# Patient Record
Sex: MALE | Race: WHITE | URBAN - METROPOLITAN AREA
[De-identification: names, ages, dates, MRNs, and addresses within clinical notes are randomized per-mention and may not be internally consistent; named-entity substitution may affect disease eponyms.]

---

## 2017-01-04 ENCOUNTER — OFFICE VISIT (OUTPATIENT)
Dept: FAMILY MEDICINE CLINIC | Age: 47
End: 2017-01-04

## 2017-01-04 ENCOUNTER — TELEPHONE (OUTPATIENT)
Dept: FAMILY MEDICINE CLINIC | Age: 47
End: 2017-01-04

## 2017-01-04 VITALS
WEIGHT: 199.25 LBS | OXYGEN SATURATION: 99 % | DIASTOLIC BLOOD PRESSURE: 62 MMHG | TEMPERATURE: 98 F | HEIGHT: 72 IN | RESPIRATION RATE: 14 BRPM | SYSTOLIC BLOOD PRESSURE: 110 MMHG | BODY MASS INDEX: 26.99 KG/M2 | HEART RATE: 52 BPM

## 2017-01-04 DIAGNOSIS — J06.9 UPPER RESPIRATORY TRACT INFECTION, UNSPECIFIED TYPE: Primary | ICD-10-CM

## 2017-01-04 DIAGNOSIS — B02.21 RAMSAY HUNT AURICULAR SYNDROME: ICD-10-CM

## 2017-01-04 RX ORDER — AZITHROMYCIN 250 MG/1
TABLET, FILM COATED ORAL
Qty: 6 TAB | Refills: 0 | Status: SHIPPED | OUTPATIENT
Start: 2017-01-04 | End: 2017-01-09

## 2017-01-04 RX ORDER — VALACYCLOVIR HYDROCHLORIDE 1 G/1
1000 TABLET, FILM COATED ORAL 3 TIMES DAILY
Qty: 10 TAB | Refills: 0 | Status: SHIPPED | OUTPATIENT
Start: 2017-01-04 | End: 2019-06-30 | Stop reason: ALTCHOICE

## 2017-01-04 NOTE — MR AVS SNAPSHOT
Visit Information Date & Time Provider Department Dept. Phone Encounter #  
 1/4/2017  1:45 PM Kamran Villalobos MD [address] [place] [address] [phone] [id number] Follow-up Instructions Return in about 6 months (around 7/4/2017) for physical.  
  
Upcoming Health Maintenance Date Due DTaP/Tdap/Td series (2 - Td) 1/1/2023 Allergies as of 1/4/2017  Review Complete On: 1/4/2017 By: Kamran Villalobos MD  
 No Known Allergies Current Immunizations  Reviewed on 11/18/2014 Name Date Influenza Vaccine 11/7/2014 Tdap 1/1/2013 Not reviewed this visit You Were Diagnosed With   
  
 Codes Comments Upper respiratory tract infection, unspecified type    -  Primary ICD-10-CM: J06.9 ICD-9-CM: 465.9 Radha Celis auricular syndrome     ICD-10-CM: B02.21 
ICD-9-CM: 053.11 Vitals BP Pulse Temp Resp Height(growth percentile) Weight(growth percentile) 110/62 (BP 1 Location: Left arm, BP Patient Position: Sitting) (!) 52 98 °F (36.7 °C) (Oral) 14 6' (1.829 m) 199 lb 4 oz (90.4 kg) SpO2 BMI Smoking Status 99% 27.02 kg/m2 Never Smoker Vitals History BMI and BSA Data Body Mass Index Body Surface Area  
 27.02 kg/m 2 2.14 m 2 Preferred Pharmacy Pharmacy Name Phone [id number] [address] [facility] [place] [phone] Your Updated Medication List  
  
   
This list is accurate as of: 1/4/17  2:39 PM.  Always use your most recent med list.  
  
  
  
  
 AFLURIA [id number] (PF) Syrg injection Generic drug:  influenza vaccine 2016-17 (5 yr+)(PF)  
  
 azithromycin 250 mg tablet Commonly known as:  Conifer Abu Take 2 tablets today, then take 1 tablet daily  
  
 glucosamine 1,000 mg Tab Take  by mouth.  
  
 multivitamin tablet Commonly known as:  ONE A DAY Take 1 Tab by mouth daily. TYLENOL EXTRA STRENGTH 500 mg tablet Generic drug:  acetaminophen Take 2 Tabs by mouth every six (6) hours as needed for Pain. valACYclovir 1 gram tablet Commonly known as:  VALTREX Take 1 Tab by mouth three (3) times daily. Prescriptions Sent to Pharmacy Refills  
 valACYclovir (VALTREX) 1 gram tablet 0 Sig: Take 1 Tab by mouth three (3) times daily. Class: Normal  
 Pharmacy: [address] [place] [address] Ph #: [phone] Route: Oral  
 azithromycin (ZITHROMAX) 250 mg tablet 0 Sig: Take 2 tablets today, then take 1 tablet daily Class: Normal  
 Pharmacy: [address] [place] [address] Ph #: [phone] Follow-up Instructions Return in about 6 months (around 7/4/2017) for physical.  
  
  
Introducing [facility] SERVICES! Rachael Grayson introduces [employer] patient portal. Now you can access parts of your medical record, email your doctor's office, and request medication refills online. 1. In your internet browser, go to [email or web address] 2. Click on the First Time User? Click Here link in the Sign In box. You will see the New Member Sign Up page. 3. Enter your [employer] Access Code exactly as it appears below. You will not need to use this code after youve completed the sign-up process. If you do not sign up before the expiration date, you must request a new code. · Pro[employer] Access Code: [email or web address] Expires: 4/4/2017  2:39 PM 
 
4. Enter the last four digits of your Social Security Number (xxxx) and Date of Birth (mm/dd/yyyy) as indicated and click Submit. You will be taken to the next sign-up page. 5. Create a Comekst ID. This will be your [employer] login ID and cannot be changed, so think of one that is secure and easy to remember. 6. Create a Comekst password. You can change your password at any time. 7. Enter your Password Reset Question and Answer. This can be used at a later time if you forget your password. 8. Enter your e-mail address. You will receive e-mail notification when new information is available in [address] 9. Click Sign Up. You can now view and download portions of your medical record. 10. Click the Download Summary menu link to download a portable copy of your medical information. If you have questions, please visit the Frequently Asked Questions section of the [employer] website. Remember, [employer] is NOT to be used for urgent needs. For medical emergencies, dial 911. Now available from your iPhone and Android! Please provide this summary of care documentation to your next provider. Your primary care clinician is listed as Any Lloyd. If you have any questions after today's visit, please call [phone].

## 2017-01-04 NOTE — PROGRESS NOTES
HISTORY OF PRESENT ILLNESS  Zulema Woodruff is a 48 y.o. male. Blood pressure 110/62, pulse (!) 52, temperature 98 °F (36.7 °C), temperature source Oral, resp. rate 14, height 6' (1.829 m), weight 199 lb 4 oz (90.4 kg), SpO2 99 %. Body mass index is 27.02 kg/(m^2). Chief Complaint   Patient presents with    Ear Pain     pain and pressure in left ear      HPI   Zulema Woodruff 48 y.o. male  presents to the office today with acute complaint of left ear pain. Bp at office today 110/62. Left ear pain/cold symptoms : Pt notes pain and pressure in left ear and in area below his left ear for past week. He rates pain as 2/10 and notes pain is aggravated by chewing. Pt states he first had a sore throat and then the \"the pain felt like it radiated from his throat to his left ear\". Pt has taken 1 tablet of OTC Motrin for a few days with no relief. Pt also notes cough for past two weeks. Cough produces white-grey mucus. Pt has history of Spring-Hunt auricular syndrome. He denies any fever, SOB, or wheezing. Likely a URI and related to his Spring-Celis syndrome. Advised pt to start taking Zithromax 250 mg daily and Valtrex 1 g TID. Pt to notify me if symptoms progress or fail to improve. Current Outpatient Prescriptions   Medication Sig Dispense Refill    multivitamin (ONE A DAY) tablet Take 1 Tab by mouth daily.  glucosamine 1,000 mg tab Take  by mouth.  AFLURIA [id number], PF, syrg injection       acetaminophen (TYLENOL EXTRA STRENGTH) 500 mg tablet Take 2 Tabs by mouth every six (6) hours as needed for Pain.        No Known Allergies  Past Medical History   Diagnosis Date    Spring Celis auricular syndrome      Past Surgical History   Procedure Laterality Date    Xr thumb lt min 2 v      Hx other surgical  5/15/13     ED visit for skin reattach on left hand, 1st finger     Family History   Problem Relation Age of Onset    Cancer Father      prostate     Social History   Substance Use Topics    Smoking status: Never Smoker    Smokeless tobacco: Never Used    Alcohol use No        Review of Systems   Constitutional: Negative. Negative for malaise/fatigue. HENT: Positive for ear pain (left ear). Eyes: Negative for blurred vision. Respiratory: Positive for cough and sputum production (white-grey). Negative for shortness of breath. Cardiovascular: Negative for chest pain and leg swelling. Musculoskeletal: Negative. Neurological: Negative. Negative for dizziness and headaches. All other systems reviewed and are negative. Physical Exam   Constitutional: He is oriented to person, place, and time. He appears well-developed and well-nourished. HENT:   Head: Normocephalic and atraumatic. Left Ear: Tympanic membrane and ear canal normal.   Small vesicles noted on the outer ear canal   Neck: Carotid bruit is not present. Cardiovascular: Normal rate, regular rhythm, normal heart sounds and intact distal pulses. Exam reveals no gallop and no friction rub. No murmur heard. Pulmonary/Chest: Effort normal and breath sounds normal. No respiratory distress. He has no wheezes. He has no rales. He exhibits no tenderness. Neurological: He is alert and oriented to person, place, and time. Psychiatric: He has a normal mood and affect. His behavior is normal. Judgment and thought content normal.   Nursing note and vitals reviewed. ASSESSMENT and PLAN  Michael Medley was seen today for ear pain. Diagnoses and all orders for this visit:    Upper respiratory tract infection, unspecified type  -     azithromycin (ZITHROMAX) 250 mg tablet; Take 2 tablets today, then take 1 tablet daily  - Advised pt to start taking Zithromax 250 mg daily and Valtrex 1 g TID. Pt to notify me if symptoms progress or fail to improve. Stoddard Celis auricular syndrome  -     valACYclovir (VALTREX) 1 gram tablet;  Take 1 Tab by mouth three (3) times daily.  - See above      Follow-up Disposition:  Return in about 6 months (around 7/4/2017) for physical.     Medication risks/benefits/costs/interactions/alternatives discussed with patient. Advised patient to call back or return to office if symptoms worsen/change/persist.  If patient cannot reach us or should anything more severe/urgent arise he/she should proceed directly to the nearest emergency department. Discussed expected course/resolution/complications of diagnosis in detail with patient. Patient given a written after visit summary which includes her diagnoses, current medications and vitals. Patient expressed understanding with the diagnosis and plan. Written by karla Oliveira, as dictated by Shannon Witt M.D.    I have reviewed and agree with the above note and have made corrections where appropriate, Dr. Eve Rodriguez MD

## 2017-01-04 NOTE — TELEPHONE ENCOUNTER
Contact # is [phone]    Patient's wife, [facility], is calling to see about getting patient an appointment today. She states last year Dr Adriana Reed misdiagnosed patient and patient ended up with some type of ear issue. She states Dr Adriana Reed should be familiar with the situation and it is starting to happen again. She states Dr Kuldip Caicedo is patient's PCP, however, Dr Adriana Reed is most familiar with this issue.

## 2017-01-04 NOTE — PROGRESS NOTES
Patient presents in office today with c/o pain and pressure in left ear, started as sore throat, hurts when eating  X 4 days. Chief Complaint   Patient presents with    Ear Pain     pain and pressure in left ear     1. Have you been to the ER, urgent care clinic since your last visit? Hospitalized since your last visit? No    2. Have you seen or consulted any other health care providers outside of the [facility] since your last visit? Include any pap smears or colon screening. No     Patient has not traveled outside of the [address] [place] [address] Road. In the past 30 days.       PHQ 2 / 9, over the last two weeks 1/4/2017   Little interest or pleasure in doing things Not at all   Feeling down, depressed or hopeless Not at all   Total Score PHQ 2 0

## 2017-01-04 NOTE — TELEPHONE ENCOUNTER
[phone] spoke to Casey Bardales advised her that we are fully booked today. She wants me to send message to Dr Tanya Hyde.

## 2019-06-27 ENCOUNTER — OFFICE VISIT (OUTPATIENT)
Dept: FAMILY MEDICINE CLINIC | Age: 49
End: 2019-06-27

## 2019-06-27 VITALS
TEMPERATURE: 98.6 F | HEART RATE: 51 BPM | HEIGHT: 72 IN | DIASTOLIC BLOOD PRESSURE: 56 MMHG | RESPIRATION RATE: 18 BRPM | WEIGHT: 191 LBS | BODY MASS INDEX: 25.87 KG/M2 | SYSTOLIC BLOOD PRESSURE: 104 MMHG | OXYGEN SATURATION: 99 %

## 2019-06-27 DIAGNOSIS — Z23 NEED FOR SHINGLES VACCINE: ICD-10-CM

## 2019-06-27 DIAGNOSIS — E55.9 VITAMIN D DEFICIENCY: ICD-10-CM

## 2019-06-27 DIAGNOSIS — Z00.00 ROUTINE GENERAL MEDICAL EXAMINATION AT A HEALTH CARE FACILITY: Primary | ICD-10-CM

## 2019-06-27 DIAGNOSIS — Z00.00 LABORATORY TESTS ORDERED AS PART OF A COMPLETE PHYSICAL EXAM (CPE): ICD-10-CM

## 2019-06-27 DIAGNOSIS — Z23 ENCOUNTER FOR IMMUNIZATION: ICD-10-CM

## 2019-06-27 PROBLEM — S61.209A FINGER WOUND, SIMPLE, OPEN: Status: ACTIVE | Noted: 2019-06-27

## 2019-06-27 NOTE — PROGRESS NOTES
HISTORY OF PRESENT ILLNESS  HPI  Shelba Schirmer is a 46 y.o. Male who presents to the office today for a complete physical. Pt complains of toe sensitivity and irritation in  the outside part of his right foot on his little toe and the adjacent toe for about a year. Pt notes the irritation is aggravated by pressure which radiates into his right calf and upper leg. Pt denies back surgery but has seen a chiropractor a few times and had an X-ray a few years ago. Pt says he has a lump in the back of his left lateral ankle, and notes he broke the ankle about 30 years ago. Pt mentions this occurred about 5-6 weeks ago. Pt notes swelling and popping bones in right lateral foot. Pt mentions he jogs 3 days a week. Pt states he had a lymph node removal in his abdomen about 4 years ago and complains of lingering stomach pain in LLQ, especially when he bends over. Pt says this pain sometimes shocks his back sometimes, with pain going down into left testicle at times. Pt reports a spot on the left side of the bridge of his nose. Pt says he uses sunscreen infrequently. Pt notes he decreased his amount of vitamin D taken from 5000 to 3000 units. Pt complains of decreased muscle recovery after going on runs. Pt denies unusual SOB, chest pain, and any recent ER visits or hospitalizations. Past Medical History:   Diagnosis Date    Radha Celis auricular syndrome      Past Surgical History:   Procedure Laterality Date    HX OTHER SURGICAL  5/15/13    ED visit for skin reattach on left hand, 1st finger    XR THUMB LT MIN 2 V       Current Outpatient Medications on File Prior to Visit   Medication Sig Dispense Refill    multivitamin (ONE A DAY) tablet Take 1 Tab by mouth daily.  glucosamine 1,000 mg tab Take  by mouth. No current facility-administered medications on file prior to visit.       No Known Allergies  Family History   Problem Relation Age of Onset    Cancer Father         prostate     Social History     Socioeconomic History    Marital status:      Spouse name: Not on file    Number of children: Not on file    Years of education: Not on file    Highest education level: Not on file   Tobacco Use    Smoking status: Never Smoker    Smokeless tobacco: Never Used   Substance and Sexual Activity    Alcohol use: No    Drug use: No    Sexual activity: Yes     Partners: Female     Birth control/protection: None             Review of Systems   Constitutional: Negative for chills, diaphoresis, fever, malaise/fatigue and weight loss. HENT: Negative for congestion, ear discharge, ear pain, hearing loss, nosebleeds, sore throat and tinnitus. Eyes: Negative for blurred vision, double vision, photophobia, pain, discharge and redness. Respiratory: Negative for cough, hemoptysis, sputum production, shortness of breath, wheezing and stridor. Cardiovascular: Negative for chest pain, palpitations, orthopnea, claudication, leg swelling and PND. Gastrointestinal: Negative for abdominal pain, blood in stool, constipation, diarrhea, heartburn, melena, nausea and vomiting. Genitourinary: Negative for dysuria, flank pain, frequency, hematuria and urgency. Musculoskeletal: Negative for back pain, falls, joint pain, myalgias and neck pain. Skin: Negative for itching and rash. Neurological: Negative for dizziness, tingling, tremors, sensory change, speech change, focal weakness, seizures, loss of consciousness, weakness and headaches. Endo/Heme/Allergies: Negative for environmental allergies and polydipsia. Does not bruise/bleed easily. Psychiatric/Behavioral: Negative for depression, hallucinations, memory loss, substance abuse and suicidal ideas. The patient is not nervous/anxious and does not have insomnia. Results for orders placed or performed in visit on 06/27/19   CBC W/O DIFF   Result Value Ref Range    WBC 4.1 3.4 - 10.8 x10E3/uL    RBC 4.50 4. 14 - 5.80 x10E6/uL    HGB 12.5 (L) 13.0 - 17.7 g/dL    HCT 40.1 37.5 - 51.0 %    MCV 89 79 - 97 fL    MCH 27.8 26.6 - 33.0 pg    MCHC 31.2 (L) 31.5 - 35.7 g/dL    RDW 15.0 12.3 - 15.4 %    PLATELET 600 162 - 045 [id number]/FN   METABOLIC PANEL, COMPREHENSIVE   Result Value Ref Range    Glucose 84 65 - 99 mg/dL    BUN 17 6 - 24 mg/dL    Creatinine 0.88 0.76 - 1.27 mg/dL    GFR est non- >59 mL/min/1.73    GFR est  >59 mL/min/1.73    BUN/Creatinine ratio 19 9 - 20    Sodium 142 134 - 144 mmol/L    Potassium 4.5 3.5 - 5.2 mmol/L    Chloride 106 96 - 106 mmol/L    CO2 25 20 - 29 mmol/L    Calcium 8.8 8.7 - 10.2 mg/dL    Protein, total 6.4 6.0 - 8.5 g/dL    Albumin 4.3 3.5 - 5.5 g/dL    GLOBULIN, TOTAL 2.1 1.5 - 4.5 g/dL    A-G Ratio 2.0 1.2 - 2.2    Bilirubin, total 0.3 0.0 - 1.2 mg/dL    Alk.  phosphatase 73 39 - 117 IU/L    AST (SGOT) 19 0 - 40 IU/L    ALT (SGPT) 12 0 - 44 IU/L   LIPID PANEL   Result Value Ref Range    Cholesterol, total 172 100 - 199 mg/dL    Triglyceride 41 0 - 149 mg/dL    HDL Cholesterol 56 >39 mg/dL    VLDL, calculated 8 5 - 40 mg/dL    LDL, calculated 108 (H) 0 - 99 mg/dL   URINALYSIS W/ RFLX MICROSCOPIC   Result Value Ref Range    Specific Gravity      >=1.030 (A) 1.005 - 1.030    pH (UA) 5.5 5.0 - 7.5    Color Yellow Yellow    Appearance Clear Clear    Leukocyte Esterase Negative Negative    Protein Negative Negative/Trace    Glucose Negative Negative    Ketone Trace (A) Negative    Blood Negative Negative    Bilirubin Negative Negative    Urobilinogen 0.2 0.2 - 1.0 mg/dL    Nitrites Negative Negative    Microscopic Examination Comment    PSA, DIAGNOSTIC (PROSTATE SPECIFIC AG)   Result Value Ref Range    Prostate Specific Ag 1.0 0.0 - 4.0 ng/mL   VITAMIN D, 25 HYDROXY   Result Value Ref Range    VITAMIN D, 25-HYDROXY 63.9 30.0 - 100.0 ng/mL   CVD REPORT   Result Value Ref Range    INTERPRETATION Note          Physical Exam  Visit Vitals  /56 (BP 1 Location: Right arm, BP Patient Position: Sitting)   Pulse (!) 51 Temp 98.6 °F (37 °C) (Oral)   Resp 18   Ht 6' (1.829 m)   Wt 191 lb (86.6 kg)   SpO2 99%   BMI 25.90 kg/m²       General:  Alert, cooperative, no distress, appears stated age. Head:  Normocephalic, without obvious abnormality, atraumatic. Eyes:  Conjunctivae/corneas clear. PERRL, EOMs intact. Fundi benign   Ears:  Normal TMs and external ear canals both ears. Nose: Nares normal. Septum midline. Mucosa normal. No drainage or sinus tenderness. Throat: Lips, mucosa, and tongue normal. Teeth and gums normal.   Neck: Supple, symmetrical, trachea midline, no adenopathy, thyroid: no enlargement/tenderness/nodules, no carotid bruit and no JVD. Back:   Symmetric, no curvature. ROM normal. No CVA tenderness. Lungs:   Clear to auscultation bilaterally. Chest wall:  No tenderness or deformity. Heart:  Regular rate and rhythm, S1, S2 normal, no murmur, click, rub or gallop. Abdomen:   Soft, non-tender. Bowel sounds normal. No masses,  No organomegaly. Genitalia:  Normal male without lesion, discharge or tenderness. Rectal:  Normal tone, normal prostate, no masses or tenderness  Guaiac negative stool. Extremities: Extremities normal, atraumatic, no cyanosis or edema. Pulses: 2+ and symmetric all extremities. Skin: Skin color, texture, turgor normal. No rashes or lesions. Pt has 1 cm cut at tip of right ring finger that is not infected. Pigmented area is present on midline of face next to nose that is flat with no atypical features (pt states no changes in past year). Typical ganglion cyst with normal overlying skin on lateral aspect of left ankle. Right 4th and 5th toes have erythema on dorsum where there is a prominent joint with tenderness to palpation. Lymph nodes: Cervical, supraclavicular, and axillary nodes normal.   Neurologic: CNII-XII intact. Normal strength, sensation and reflexes throughout. ASSESSMENT and PLAN    ICD-10-CM ICD-9-CM    1.  Routine general medical examination at a health care facility Z00.00 V70.0 CBC W/O DIFF      METABOLIC PANEL, COMPREHENSIVE      LIPID PANEL      URINALYSIS W/ RFLX MICROSCOPIC      PSA, DIAGNOSTIC (PROSTATE SPECIFIC AG)      VITAMIN D, 25 HYDROXY   2. Vitamin D deficiency E55.9 268.9 VITAMIN D, 25 HYDROXY   3. Laboratory tests ordered as part of a complete physical exam (CPE) Z00.00 V72.62 CBC W/O DIFF      METABOLIC PANEL, COMPREHENSIVE      LIPID PANEL   4. Need for shingles vaccine Z23 V04.89 varicella-zoster recombinant, PF, (SHINGRIX, PF,) 50 mcg/0.5 mL susr injection   5. Encounter for immunization Z23 V03.89 TETANUS AND DIPHTHERIA TOXOIDS (TD) ADSORBED, PRES. FREE, IN INDIVIDS. >=7, IM     Diagnoses and all orders for this visit:    1. Routine general medical examination at a health care facility  -     CBC W/O DIFF  -     METABOLIC PANEL, COMPREHENSIVE  -     LIPID PANEL  -     URINALYSIS W/ RFLX MICROSCOPIC  -     PSA, DIAGNOSTIC (PROSTATE SPECIFIC AG)  -     VITAMIN D, 25 HYDROXY    2. Vitamin D deficiency  -     VITAMIN D, 25 HYDROXY    3. Laboratory tests ordered as part of a complete physical exam (CPE)  -     CBC W/O DIFF  -     METABOLIC PANEL, COMPREHENSIVE  -     LIPID PANEL    4. Need for shingles vaccine  -     varicella-zoster recombinant, PF, (SHINGRIX, PF,) 50 mcg/0.5 mL susr injection; 0.5 mL by IntraMUSCular route once for 1 dose. Take 2 dose 2 months after first dose    5. Encounter for immunization  -     TETANUS AND DIPHTHERIA TOXOIDS (TD) ADSORBED, PRES. FREE, IN INDIVIDS. >=7, IM    Other orders  -     CVD REPORT          lab results and schedule of future lab studies reviewed with patient  reviewed diet, exercise and weight control  cardiovascular risk and specific lipid/LDL goals reviewed  reviewed medications and side effects in detail  Please call my office if there are any questions- [phone]. I will arrange for follow up after the lab tests done from today return  Recommended a weekly \"heart check. \" I went into detail how to do this. Call for refills on medications as needed. Discussed expected course/resolution/complications of diagnosis in detail with patient. Medication risks/benefits/costs/interactions/alternatives discussed with patient. Pt was given an after visit summary which includes diagnoses, current medications & vitals. Pt expressed understanding with the diagnosis and plan    Reassured him that all of his complaints are of no major concern and recommended that he notify me is there are any significant changes concerning these issues. For the toe pain, suggested padding with mole skin. For his LLQ abdominal discomfort, suggested he try a deep massage and stretching of the area; this is typical of post surgery healing/  Scar discomfort. There is family history of prostate cancer so pt should have his checked yearly. Recommended he have a colonoscopy. This document was written by Ezekiel Miner, as dictated by Gerri Canchola MD.  I have reviewed and agree with the above note and have made corrections where appropriate Ciro Harvey M.D.

## 2019-06-27 NOTE — LETTER
June 27, 2019 Deborah De La Rosa [id number] [place] [id number] Dear Jing Moncada: Thank you for requesting access to [employer]. Please follow the instructions below to securely access and download your online medical record. [employer] allows you to send messages to your doctor, view your test results, renew your prescriptions, schedule appointments, and more. How Do I Sign Up? 1. In your internet browser, go to [email or web address]. 2. Click on the First Time User? Click Here link in the Sign In box. You will see the New Member Sign Up page. 3. Enter your [employer] Access Code exactly as it appears below. You will not need to use this code after youve completed the sign-up process. If you do not sign up before the expiration date, you must request a new code. [employer] Access Code: [email or web address] Expires: 8/11/2019 11:08 AM  
 
4. Enter the last four digits of your Social Security Number (xxxx) and Date of Birth (mm/dd/yyyy) as indicated and click Submit. You will be taken to the next sign-up page. 5. Create a [employer]une ID. This will be your [employer] login ID and cannot be changed, so think of one that is secure and easy to remember. 6. Create a [employer] password. You can change your password at any time. 7. Enter your Password Reset Question and Answer. This can be used at a later time if you forget your password. 8. Enter your e-mail address. You will receive e-mail notification when new information is available in [address]. 9. Click Sign Up. You can now view and download portions of your medical record. 10. Click the Download Summary menu link to download a portable copy of your medical information. Additional Information If you have questions, please visit the Frequently Asked Questions section of the [employer] website at [email or web address] Remember, [employer] is NOT to be used for urgent needs. For medical emergencies, dial 911. Now available from your iPhone and Android! Sincerely, [employer]

## 2019-06-28 LAB
25(OH)D3+25(OH)D2 SERPL-MCNC: 63.9 NG/ML (ref 30–100)
ALBUMIN SERPL-MCNC: 4.3 G/DL (ref 3.5–5.5)
ALBUMIN/GLOB SERPL: 2 {RATIO} (ref 1.2–2.2)
ALP SERPL-CCNC: 73 IU/L (ref 39–117)
ALT SERPL-CCNC: 12 IU/L (ref 0–44)
APPEARANCE UR: CLEAR
AST SERPL-CCNC: 19 IU/L (ref 0–40)
BILIRUB SERPL-MCNC: 0.3 MG/DL (ref 0–1.2)
BILIRUB UR QL STRIP: NEGATIVE
BUN SERPL-MCNC: 17 MG/DL (ref 6–24)
BUN/CREAT SERPL: 19 (ref 9–20)
CALCIUM SERPL-MCNC: 8.8 MG/DL (ref 8.7–10.2)
CHLORIDE SERPL-SCNC: 106 MMOL/L (ref 96–106)
CHOLEST SERPL-MCNC: 172 MG/DL (ref 100–199)
CO2 SERPL-SCNC: 25 MMOL/L (ref 20–29)
COLOR UR: YELLOW
CREAT SERPL-MCNC: 0.88 MG/DL (ref 0.76–1.27)
ERYTHROCYTE [DISTWIDTH] IN BLOOD BY AUTOMATED COUNT: 15 % (ref 12.3–15.4)
GLOBULIN SER CALC-MCNC: 2.1 G/DL (ref 1.5–4.5)
GLUCOSE SERPL-MCNC: 84 MG/DL (ref 65–99)
GLUCOSE UR QL: NEGATIVE
HCT VFR BLD AUTO: 40.1 % (ref 37.5–51)
HDLC SERPL-MCNC: 56 MG/DL
HGB BLD-MCNC: 12.5 G/DL (ref 13–17.7)
HGB UR QL STRIP: NEGATIVE
INTERPRETATION, 910389: NORMAL
KETONES UR QL STRIP: ABNORMAL
LDLC SERPL CALC-MCNC: 108 MG/DL (ref 0–99)
LEUKOCYTE ESTERASE UR QL STRIP: NEGATIVE
MCH RBC QN AUTO: 27.8 PG (ref 26.6–33)
MCHC RBC AUTO-ENTMCNC: 31.2 G/DL (ref 31.5–35.7)
MCV RBC AUTO: 89 FL (ref 79–97)
MICRO URNS: ABNORMAL
NITRITE UR QL STRIP: NEGATIVE
PH UR STRIP: 5.5 [PH] (ref 5–7.5)
PLATELET # BLD AUTO: 233 X10E3/UL (ref 150–450)
POTASSIUM SERPL-SCNC: 4.5 MMOL/L (ref 3.5–5.2)
PROT SERPL-MCNC: 6.4 G/DL (ref 6–8.5)
PROT UR QL STRIP: NEGATIVE
PSA SERPL-MCNC: 1 NG/ML (ref 0–4)
RBC # BLD AUTO: 4.5 X10E6/UL (ref 4.14–5.8)
SODIUM SERPL-SCNC: 142 MMOL/L (ref 134–144)
SP GR UR: >=1.03 (ref 1–1.03)
TRIGL SERPL-MCNC: 41 MG/DL (ref 0–149)
UROBILINOGEN UR STRIP-MCNC: 0.2 MG/DL (ref 0.2–1)
VLDLC SERPL CALC-MCNC: 8 MG/DL (ref 5–40)
WBC # BLD AUTO: 4.1 X10E3/UL (ref 3.4–10.8)

## 2019-07-01 NOTE — PROGRESS NOTES
Good news! ! As you can see above, your lab tests done recently at your physical all came back normal; no signs of diabetes, excellent cholesterol levels, normal liver and kidney function and a normal Vitamin D level, so continue your same Vit D dose. . I would recommend that you follow up for a full physical every 2 years until the age of 59, and then every year.  The years that you are not having a physical, you will need a short appointment for a prostate exam ; your PSA (prostate cancer check) also came back normal.

## 2019-07-21 NOTE — PROGRESS NOTES
Chief Complaint   Patient presents with    Complete Physical       Reviewed Record in preparation for visit and have obtained necessary documentation. Identified pt with two pt identifiers (Name @ )    Health Maintenance Due   Topic    Shingrix Vaccine Age 50> (1 of 2)    FOBT Q 1 YEAR AGE 50-75          1. Have you been to the ER, urgent care clinic since your last visit? Hospitalized since your last visit? no    2. Have you seen or consulted any other health care providers outside of the [address] [place] [address] since your last visit? Include any pap smears or colon screening.  no No

## 2022-03-19 PROBLEM — S61.209A FINGER WOUND, SIMPLE, OPEN: Status: ACTIVE | Noted: 2019-06-27
